# Patient Record
Sex: FEMALE | Race: WHITE | HISPANIC OR LATINO | Employment: FULL TIME | URBAN - METROPOLITAN AREA
[De-identification: names, ages, dates, MRNs, and addresses within clinical notes are randomized per-mention and may not be internally consistent; named-entity substitution may affect disease eponyms.]

---

## 2018-06-22 ENCOUNTER — HOSPITAL ENCOUNTER (EMERGENCY)
Facility: HOSPITAL | Age: 58
Discharge: HOME | End: 2018-06-23
Attending: EMERGENCY MEDICINE | Admitting: EMERGENCY MEDICINE

## 2018-06-22 DIAGNOSIS — E87.1 HYPONATREMIA: ICD-10-CM

## 2018-06-22 DIAGNOSIS — I10 ESSENTIAL HYPERTENSION: Primary | ICD-10-CM

## 2018-06-22 LAB
ALBUMIN SERPL-MCNC: 4.6 G/DL (ref 3.5–5.2)
ALBUMIN/GLOB SERPL: 1.4 G/DL
ALP SERPL-CCNC: 69 U/L (ref 39–117)
ALT SERPL W P-5'-P-CCNC: 31 U/L (ref 1–33)
ANION GAP SERPL CALCULATED.3IONS-SCNC: 13.9 MMOL/L
AST SERPL-CCNC: 38 U/L (ref 1–32)
BASOPHILS # BLD AUTO: 0.04 10*3/MM3 (ref 0–0.2)
BASOPHILS NFR BLD AUTO: 0.5 % (ref 0–1.5)
BILIRUB SERPL-MCNC: 1.2 MG/DL (ref 0.1–1.2)
BUN BLD-MCNC: 10 MG/DL (ref 6–20)
BUN/CREAT SERPL: 18.2 (ref 7–25)
CALCIUM SPEC-SCNC: 9.4 MG/DL (ref 8.6–10.5)
CHLORIDE SERPL-SCNC: 91 MMOL/L (ref 98–107)
CO2 SERPL-SCNC: 25.1 MMOL/L (ref 22–29)
CREAT BLD-MCNC: 0.55 MG/DL (ref 0.57–1)
DEPRECATED RDW RBC AUTO: 43 FL (ref 37–54)
EOSINOPHIL # BLD AUTO: 0.03 10*3/MM3 (ref 0–0.7)
EOSINOPHIL NFR BLD AUTO: 0.4 % (ref 0.3–6.2)
ERYTHROCYTE [DISTWIDTH] IN BLOOD BY AUTOMATED COUNT: 12.3 % (ref 11.7–13)
GFR SERPL CREATININE-BSD FRML MDRD: 113 ML/MIN/1.73
GLOBULIN UR ELPH-MCNC: 3.2 GM/DL
GLUCOSE BLD-MCNC: 97 MG/DL (ref 65–99)
HCT VFR BLD AUTO: 42.8 % (ref 35.6–45.5)
HGB BLD-MCNC: 14 G/DL (ref 11.9–15.5)
IMM GRANULOCYTES # BLD: 0 10*3/MM3 (ref 0–0.03)
IMM GRANULOCYTES NFR BLD: 0 % (ref 0–0.5)
LYMPHOCYTES # BLD AUTO: 1.38 10*3/MM3 (ref 0.9–4.8)
LYMPHOCYTES NFR BLD AUTO: 18.7 % (ref 19.6–45.3)
MCH RBC QN AUTO: 31.2 PG (ref 26.9–32)
MCHC RBC AUTO-ENTMCNC: 32.7 G/DL (ref 32.4–36.3)
MCV RBC AUTO: 95.3 FL (ref 80.5–98.2)
MONOCYTES # BLD AUTO: 0.54 10*3/MM3 (ref 0.2–1.2)
MONOCYTES NFR BLD AUTO: 7.3 % (ref 5–12)
NEUTROPHILS # BLD AUTO: 5.39 10*3/MM3 (ref 1.9–8.1)
NEUTROPHILS NFR BLD AUTO: 73.1 % (ref 42.7–76)
PLATELET # BLD AUTO: 126 10*3/MM3 (ref 140–500)
PMV BLD AUTO: 12.1 FL (ref 6–12)
POTASSIUM BLD-SCNC: 3.5 MMOL/L (ref 3.5–5.2)
PROT SERPL-MCNC: 7.8 G/DL (ref 6–8.5)
RBC # BLD AUTO: 4.49 10*6/MM3 (ref 3.9–5.2)
SODIUM BLD-SCNC: 130 MMOL/L (ref 136–145)
TROPONIN T SERPL-MCNC: <0.01 NG/ML (ref 0–0.03)
WBC NRBC COR # BLD: 7.38 10*3/MM3 (ref 4.5–10.7)

## 2018-06-22 PROCEDURE — 84443 ASSAY THYROID STIM HORMONE: CPT | Performed by: EMERGENCY MEDICINE

## 2018-06-22 PROCEDURE — 85025 COMPLETE CBC W/AUTO DIFF WBC: CPT | Performed by: NURSE PRACTITIONER

## 2018-06-22 PROCEDURE — 93005 ELECTROCARDIOGRAM TRACING: CPT | Performed by: NURSE PRACTITIONER

## 2018-06-22 PROCEDURE — 84484 ASSAY OF TROPONIN QUANT: CPT | Performed by: NURSE PRACTITIONER

## 2018-06-22 PROCEDURE — 84439 ASSAY OF FREE THYROXINE: CPT | Performed by: EMERGENCY MEDICINE

## 2018-06-22 PROCEDURE — 99284 EMERGENCY DEPT VISIT MOD MDM: CPT

## 2018-06-22 PROCEDURE — 80053 COMPREHEN METABOLIC PANEL: CPT | Performed by: NURSE PRACTITIONER

## 2018-06-22 RX ORDER — AMLODIPINE BESYLATE 5 MG/1
5 TABLET ORAL DAILY
COMMUNITY
Start: 2018-06-15

## 2018-06-22 RX ORDER — LISINOPRIL 20 MG/1
20 TABLET ORAL DAILY
COMMUNITY

## 2018-06-22 RX ORDER — SIMVASTATIN 20 MG
20 TABLET ORAL NIGHTLY
COMMUNITY

## 2018-06-23 VITALS
DIASTOLIC BLOOD PRESSURE: 69 MMHG | RESPIRATION RATE: 16 BRPM | WEIGHT: 131 LBS | HEART RATE: 75 BPM | OXYGEN SATURATION: 100 % | HEIGHT: 67 IN | BODY MASS INDEX: 20.56 KG/M2 | TEMPERATURE: 97.8 F | SYSTOLIC BLOOD PRESSURE: 105 MMHG

## 2018-06-23 LAB
HOLD SPECIMEN: NORMAL
HOLD SPECIMEN: NORMAL
T4 FREE SERPL-MCNC: 1.54 NG/DL (ref 0.93–1.7)
TSH SERPL DL<=0.05 MIU/L-ACNC: 2.7 MIU/ML (ref 0.27–4.2)
WHOLE BLOOD HOLD SPECIMEN: NORMAL
WHOLE BLOOD HOLD SPECIMEN: NORMAL

## 2018-06-23 PROCEDURE — 93010 ELECTROCARDIOGRAM REPORT: CPT | Performed by: INTERNAL MEDICINE

## 2019-08-09 ENCOUNTER — HOSPITAL ENCOUNTER (OUTPATIENT)
Dept: HOSPITAL 88 - MAMMO | Age: 59
End: 2019-08-09
Attending: OBSTETRICS & GYNECOLOGY
Payer: COMMERCIAL

## 2019-08-09 DIAGNOSIS — Z12.31: Primary | ICD-10-CM

## 2019-08-09 DIAGNOSIS — Z78.0: ICD-10-CM

## 2019-08-09 PROCEDURE — 77080 DXA BONE DENSITY AXIAL: CPT

## 2019-08-09 PROCEDURE — 77067 SCR MAMMO BI INCL CAD: CPT

## 2019-08-12 NOTE — DIAGNOSTIC IMAGING REPORT
Exam: Bone mineral density study.



History:  Osteopenia.



Comparison:  None



Discussion: Evaluation of the left hip and lumbar spine was performed utilizing

DEXA Hologic bone densitometer. 



The study is technically adequate.



Left hip total bone mineral density: 0.777gm/cm2, T-score is -1.4, Z-score is

-0.4. 

Left hip femoral neck bone mineral density: 0.749gm/cm2, T-score is -1.1,

Z-score is 0.2. 

Lumbar spine total bone mineral density:0.854gm/cm2, T-score is-1.8, Z-score is

-0.3.  



Impression:

1. Osteopenia of the left hip, fracture risk is increased

2. Osteopenia of the lumbar spine, fracture risk is increased





Least significant change (LSC) for bone mineral density as provided by

 is 0.023 g/cm2 for lumbar spine and 0.027 g/cm2 for total hip.



10 -year fracture risk per WHO Fracture Risk Assessment Tool (FRAX) for:

Major osteoporotic fracture is 3.8%

Hip fracture is 0.2%

The above fracture probability is calculated for an untreated patient. Fracture

probably may be lower if the patient has received treatment. All treatment

decisions require clinical judgment and consideration of individual patient

factors, including patient preferences, comorbidities, previous drug use and

risk factors not captured in the FRAX model (e.g. frailty, falls, vitamin D

deficiency, increased bone turnover, interval significant decline in BMD).





The patient's fracture risk is compared to an age-matched control.



Medical evaluation for secondary causes of low bone bone mineral density may be

appropriate.



Correlate clinically for the necessity and timing of the next bone mineral

density study.









Signed by: Dr. Branden Lala M.D. on 8/12/2019 8:08 AM

## 2019-08-16 NOTE — DIAGNOSTIC IMAGING REPORT
#[id number] - [id number]

#BILATERAL DIGITAL SCREENING MAMMOGRAM WITH CAD: 8/9/2019

CLINICAL: Routine screening.  



No prior exams were available for comparison.  Current study contains 4 films.  

The tissue of both breasts is predominantly fatty.  

Current study was also evaluated with a Computer Aided Detection (CAD) system.  

No significant masses, calcifications, or other findings are seen in either breast.  



IMPRESSION: NEGATIVE

There is no mammographic evidence of malignancy.  A 1 year screening mammogram is recommended. 

The patient will be notified by letter of the results.  





BOBY SOMMERS M.D.          

ct/penrad:8/15/2019 17:46:38  



Imaging Technologist: Nicky CANCINO)(GERARDO), [facility]

letter sent: Normal Exam  

Mammogram BI-RADS: 1 Negative